# Patient Record
Sex: MALE | Race: WHITE | NOT HISPANIC OR LATINO | URBAN - METROPOLITAN AREA
[De-identification: names, ages, dates, MRNs, and addresses within clinical notes are randomized per-mention and may not be internally consistent; named-entity substitution may affect disease eponyms.]

---
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
## 2022-03-31 NOTE — H&P PST ADULT - HEIGHT IN INCHES
Home care  · Keep the wound clean and dry. Do not get the wound wet for the first 24hours. If the bandage gets wet, remove it. Gently pat the wound dry with a clean cloth. Then put on a clean, dry bandage..  · To help prevent infection, wash your hands with soap and water before caring for the wound.   · Caring for sutures or staples: Once you no longer need to keep them dry, clean the wound daily. First, remove the bandage. Then wash the area gently with soap and warm water, or as directed by the health care provider. Use a wet cotton swab to loosen and remove any blood or crust that forms. After cleaning, apply a thin layer of antibiotic ointment if advised. Then put on a new bandage unless you are told not to.    When to seek medical advice  · Wound bleeding not controlled by direct pressure  · Signs of infection, including increasing pain at the wound, increasing wound redness or swelling, or pus or bad odor coming from the wound  · Fever greater than 100.5°F (38.ºC)   · Wound re-opens      
10
